# Patient Record
Sex: MALE | Race: WHITE | NOT HISPANIC OR LATINO | Employment: FULL TIME | URBAN - NONMETROPOLITAN AREA
[De-identification: names, ages, dates, MRNs, and addresses within clinical notes are randomized per-mention and may not be internally consistent; named-entity substitution may affect disease eponyms.]

---

## 2017-01-19 ENCOUNTER — OFFICE VISIT (OUTPATIENT)
Dept: NEUROLOGY | Facility: CLINIC | Age: 42
End: 2017-01-19

## 2017-01-19 VITALS
OXYGEN SATURATION: 98 % | HEIGHT: 68 IN | WEIGHT: 267 LBS | HEART RATE: 77 BPM | DIASTOLIC BLOOD PRESSURE: 90 MMHG | SYSTOLIC BLOOD PRESSURE: 150 MMHG | BODY MASS INDEX: 40.47 KG/M2

## 2017-01-19 DIAGNOSIS — R53.83 FATIGUE, UNSPECIFIED TYPE: ICD-10-CM

## 2017-01-19 DIAGNOSIS — G47.19 EXCESSIVE DAYTIME SLEEPINESS: Primary | ICD-10-CM

## 2017-01-19 PROCEDURE — 99214 OFFICE O/P EST MOD 30 MIN: CPT | Performed by: NURSE PRACTITIONER

## 2017-01-19 RX ORDER — CALCIUM CARBONATE 750 MG/1
750 TABLET, CHEWABLE ORAL AS NEEDED
COMMUNITY

## 2017-01-19 RX ORDER — METOPROLOL SUCCINATE 50 MG/1
TABLET, EXTENDED RELEASE ORAL 2 TIMES DAILY
Refills: 2 | COMMUNITY
Start: 2017-01-04 | End: 2017-05-04 | Stop reason: SDUPTHER

## 2017-01-19 RX ORDER — PRAVASTATIN SODIUM 40 MG
TABLET ORAL
COMMUNITY
Start: 2014-09-16

## 2017-01-19 RX ORDER — VERAPAMIL HYDROCHLORIDE 240 MG/1
CAPSULE, EXTENDED RELEASE ORAL DAILY
COMMUNITY
Start: 2014-09-16

## 2017-01-19 RX ORDER — TRAZODONE HYDROCHLORIDE 100 MG/1
TABLET ORAL
COMMUNITY
Start: 2014-09-16

## 2017-01-19 RX ORDER — MONTELUKAST SODIUM 10 MG/1
TABLET ORAL
COMMUNITY

## 2017-02-24 ENCOUNTER — HOSPITAL ENCOUNTER (OUTPATIENT)
Dept: SLEEP MEDICINE | Facility: HOSPITAL | Age: 42
Setting detail: THERAPIES SERIES
End: 2017-02-24

## 2017-02-24 DIAGNOSIS — R53.83 FATIGUE, UNSPECIFIED TYPE: ICD-10-CM

## 2017-02-24 DIAGNOSIS — G47.19 EXCESSIVE DAYTIME SLEEPINESS: ICD-10-CM

## 2017-02-24 PROCEDURE — 95810 POLYSOM 6/> YRS 4/> PARAM: CPT | Performed by: PSYCHIATRY & NEUROLOGY

## 2017-02-24 PROCEDURE — 95810 POLYSOM 6/> YRS 4/> PARAM: CPT

## 2017-03-30 ENCOUNTER — OFFICE VISIT (OUTPATIENT)
Dept: NEUROLOGY | Facility: CLINIC | Age: 42
End: 2017-03-30

## 2017-03-30 VITALS
BODY MASS INDEX: 40.47 KG/M2 | HEIGHT: 68 IN | SYSTOLIC BLOOD PRESSURE: 142 MMHG | DIASTOLIC BLOOD PRESSURE: 72 MMHG | WEIGHT: 267 LBS

## 2017-03-30 DIAGNOSIS — D50.8 OTHER IRON DEFICIENCY ANEMIA: Primary | ICD-10-CM

## 2017-03-30 PROCEDURE — 99214 OFFICE O/P EST MOD 30 MIN: CPT | Performed by: NURSE PRACTITIONER

## 2017-03-30 RX ORDER — ROPINIROLE 0.5 MG/1
TABLET, FILM COATED ORAL
Qty: 30 TABLET | Refills: 5 | Status: SHIPPED | OUTPATIENT
Start: 2017-03-30

## 2017-03-30 RX ORDER — ROPINIROLE 0.5 MG/1
TABLET, FILM COATED ORAL
Qty: 30 TABLET | Refills: 5 | Status: SHIPPED | OUTPATIENT
Start: 2017-03-30 | End: 2017-03-30 | Stop reason: SDUPTHER

## 2017-05-04 RX ORDER — METOPROLOL SUCCINATE 50 MG/1
TABLET, EXTENDED RELEASE ORAL
Qty: 90 TABLET | Refills: 1 | Status: SHIPPED | OUTPATIENT
Start: 2017-05-04

## 2020-07-24 ENCOUNTER — TRANSCRIBE ORDERS (OUTPATIENT)
Dept: ADMINISTRATIVE | Facility: HOSPITAL | Age: 45
End: 2020-07-24

## 2020-07-24 ENCOUNTER — HOSPITAL ENCOUNTER (OUTPATIENT)
Dept: GENERAL RADIOLOGY | Facility: HOSPITAL | Age: 45
Discharge: HOME | End: 2020-07-24
Admitting: FAMILY MEDICINE

## 2020-07-24 DIAGNOSIS — J45.909 ASTHMA, UNSPECIFIED ASTHMA SEVERITY, UNSPECIFIED WHETHER COMPLICATED, UNSPECIFIED WHETHER PERSISTENT: ICD-10-CM

## 2020-07-24 DIAGNOSIS — J45.909 ASTHMA, UNSPECIFIED ASTHMA SEVERITY, UNSPECIFIED WHETHER COMPLICATED, UNSPECIFIED WHETHER PERSISTENT: Primary | ICD-10-CM

## 2020-07-24 PROCEDURE — 71046 X-RAY EXAM CHEST 2 VIEWS: CPT

## 2025-04-16 NOTE — PROGRESS NOTES
"Chief Complaint  Witnessed Apnea, Daytime Sleepiness, and Fatigue    Subjective     History of Present Illness:  Tejas Garcia is a 50 y.o. male with COPD, hypertension, hyperlipidemia, migraines, and obesity.  The patient is referred by ZACH Mandel with primary care.  Patient has had multiple sleep studies.  A home sleep test obtained on 10/6/2023 revealed mild obstructive sleep apnea with an AHI of 6.0/h. He did use pap but had difficulty keeping the mask on.  He is very tired and tosses and turns frequently.  Review of self-reported questionnaire notes that he is \"exhausted always\".  He typically goes to bed at 330-4 AM waking at 11 AM on weekdays and weekends.  Patient estimates an average of 6 hours of fragmented sleep nightly.  He does use tobacco, drinks alcohol.    Further details are as follows:    Whiting Scale is (out of 24): Total score: 4     Estimated average amount of sleep per night: 6 hours  Number of times he wakes up at night: 10+ times  Difficulty falling back asleep: yes  It usually takes 10-15 minutes to go to sleep.  He feels sleepy upon waking up: yes  Rotating or night shift work: yes    Drowsiness/Sleepiness:  He exhibits the following:  excessive daytime sleepiness  excessive daytime fatigue  Sleeps a lot on the weekends    Snoring/Breathing:  He exhibits the following:  quits breathing at night and morning headaches    Head Injury:  He exhibits the following:  No    Reflux:  He describes the following:  Not waking with reflux    Narcolepsy:  He exhibits the following:  none    RLS/PLMs:  He describes the following:  none    Insomnia:  He describes the following:  frequent awakenings  bothered by pain at night   restless sleep    Parasomnia:  He exhibits the following:  none    Weight:       04/21/25  1451   Weight: 99.3 kg (219 lb)      Weight change in the last year:  gain: 0 lbs    The patient's relevant past medical, surgical, family, and social history reviewed and updated in " Epic as appropriate.    Review of Systems   Constitutional: Negative.    HENT: Negative.     Eyes: Negative.    Respiratory: Negative.     Cardiovascular: Negative.    Gastrointestinal: Negative.    Endocrine: Negative.    Genitourinary: Negative.    Musculoskeletal: Negative.    Skin: Negative.    Allergic/Immunologic: Negative.    Neurological: Negative.    Hematological: Negative.    Psychiatric/Behavioral: Negative.     All other systems reviewed and are negative.      PMH:    Past Medical History:   Diagnosis Date    Benign hypertension     COPD (chronic obstructive pulmonary disease)     Dyslipidemia     Hyperlipidemia     Migraines     Mild obesity     RYAN (obstructive sleep apnea)     PVC (premature ventricular contraction)     Holter monitor revealing a normal sinus rhythm rare PVC, unifocal in nature.     Seasonal allergies     Tachycardia      Past Surgical History:   Procedure Laterality Date    CARPAL TUNNEL RELEASE      CUBITAL TUNNEL RELEASE      SHOULDER SURGERY         No Known Allergies    MEDS:  Prior to Admission medications    Medication Sig Start Date End Date Taking? Authorizing Provider   amoxicillin-clavulanate (AUGMENTIN) 875-125 MG per tablet 1 po bid 2/27/25   Maria Alejandra Higuera MD   aspirin 81 MG tablet Take  by mouth Daily. 9/16/14   Nella Canas MD   calcium carbonate EX (TUMS EX) 750 MG chewable tablet Chew 1 tablet As Needed for Indigestion or Heartburn.    Nella Canas MD   Cholecalciferol 25 MCG (1000 UT) tablet Take 5 tablets by mouth Daily.    Nella Canas MD   Fluticasone Furoate-Vilanterol (Breo Ellipta) 100-25 MCG/ACT aerosol powder  Inhale 1 puff Daily. 2/15/22   Nella Canas MD   hydroCHLOROthiazide (MICROZIDE) 12.5 MG capsule Take 1 capsule by mouth Daily.    Emergency, Nurse Epic, RN   ipratropium-albuterol (DUO-NEB) 0.5-2.5 mg/3 ml nebulizer Nebulize q 4 h prn wheezing / shortness of breath 5/20/24   Maria Alejandra Higuera MD Lasmiditan  "Succinate 100 MG tablet Take 100 mg by mouth. 11/6/24   Nella Canas MD   methylPREDNISolone (MEDROL) 4 MG dose pack Take as directed on package instructions. 2/27/25   Maria Alejandra Higuera MD   metoprolol succinate XL (TOPROL-XL) 50 MG 24 hr tablet TAKE 1 TABLET DAILY. 5/4/17   Case, RAGHU Bellamy   montelukast (SINGULAIR) 10 MG tablet Montelukast Sodium 10 MG Oral Tablet; Patient Sig: Montelukast Sodium 10 MG Oral Tablet TAKE 1 TABLET BY MOUTH DAILY; 0; 16-Sep-2014; Active    Provider, MD Nella   pravastatin (PRAVACHOL) 40 MG tablet Take  by mouth. 9/16/14   Nella Canas MD   tadalafil (CIALIS) 5 MG tablet Take  by mouth. 4/19/24   Nella Canas MD   topiramate (TOPAMAX) 50 MG tablet Take 3 tablets by mouth Daily. 11/6/24   ProviderNella MD       FH:  History reviewed. No pertinent family history.    Objective   Vital Signs:  /86   Pulse 81   Temp 98.5 °F (36.9 °C) (Temporal)   Ht 175.3 cm (69\")   Wt 99.3 kg (219 lb)   SpO2 96%   BMI 32.34 kg/m²     Patient's (Body mass index is 32.34 kg/m².) indicates that they are obese (BMI >30) with health related conditions that include obstructive sleep apnea, hypertension, coronary heart disease, and diabetes mellitus . Weight is unchanged. BMI is is above average; BMI management plan is completed. We discussed portion control and increasing exercise.         Physical Exam  Vitals reviewed.   Constitutional:       Appearance: Normal appearance.   HENT:      Head: Normocephalic and atraumatic.      Nose: Nose normal.      Mouth/Throat:      Mouth: Mucous membranes are moist.   Cardiovascular:      Rate and Rhythm: Normal rate and regular rhythm.      Heart sounds: No murmur heard.     No friction rub. No gallop.   Pulmonary:      Effort: Pulmonary effort is normal. No respiratory distress.      Breath sounds: Normal breath sounds. No wheezing or rhonchi.   Neurological:      Mental Status: He is alert and oriented to person, " place, and time.   Psychiatric:         Behavior: Behavior normal.       Mallampati Score: IV (only hard palate visible)    Result Review :              Assessment and Plan  Tejas Garcia is a 50 y.o. male with COPD, hypertension, hyperlipidemia, migraines, and obesity who presents for further evaluation of RYAN previously on PAP therapy.  Most recent sleep study showed mild sleep apnea with an AHI of 6/h.  Patient has tried PAP therapy in the past but frequently wakes with the mask off.  He has been intolerant to the mask and device.  He does retain his device and I have asked him to restart therapy.  I will also obtain an overnight oximetry study for further evaluation of nocturnal hypoxemia.  Patient has a history of COPD.  If overnight oximetry study is abnormal then recommendation would be for in-lab titration study plus or minus oxygen.  I suspect some of his symptoms including frequent and ongoing daytime sleepiness and fatigue, nonrestorative sleep, and migraines are related to uncontrolled sleep apnea.  Patient will follow-up in 2 to 3 months with download.  I have discussed alternatives to PAP therapy including MAD, and surgical consult.  Patient's sleep apnea is mild such that he does not qualify for inspire consult.    Diagnoses and all orders for this visit:    1. Obstructive sleep apnea, adult (Primary)  -     PAP Therapy    2. Snoring    3. Hypersomnia with sleep apnea    4. Nocturnal hypoxemia  -     Overnight Sleep Oximetry Study; Future    5. RYAN (obstructive sleep apnea)  -     Overnight Sleep Oximetry Study; Future                 I discussed the consequences of uncontrolled sleep apnea including hypertension, heart disease, diabetes, stroke, and dementia. I further discussed sleep apnea therapeutic options including CPAP, Weight loss, Oral dental appliance, and surgery.         Follow Up  Return in about 5 weeks (around 5/26/2025) for Recheck.  Patient was given instructions and counseling  regarding his condition or for health maintenance advice. Please see specific information pulled into the AVS if appropriate.     ZACH Alegria, ACNP-BC  Pulmonology, Critical Care, and Sleep Medicine

## 2025-04-21 ENCOUNTER — OFFICE VISIT (OUTPATIENT)
Dept: SLEEP MEDICINE | Age: 50
End: 2025-04-21
Payer: COMMERCIAL

## 2025-04-21 VITALS
HEIGHT: 69 IN | DIASTOLIC BLOOD PRESSURE: 86 MMHG | BODY MASS INDEX: 32.44 KG/M2 | HEART RATE: 81 BPM | OXYGEN SATURATION: 96 % | WEIGHT: 219 LBS | SYSTOLIC BLOOD PRESSURE: 140 MMHG | TEMPERATURE: 98.5 F

## 2025-04-21 DIAGNOSIS — G47.34 NOCTURNAL HYPOXEMIA: ICD-10-CM

## 2025-04-21 DIAGNOSIS — G47.33 OBSTRUCTIVE SLEEP APNEA, ADULT: Primary | ICD-10-CM

## 2025-04-21 DIAGNOSIS — R06.83 SNORING: ICD-10-CM

## 2025-04-21 DIAGNOSIS — G47.33 OSA (OBSTRUCTIVE SLEEP APNEA): ICD-10-CM

## 2025-04-21 DIAGNOSIS — G47.30 HYPERSOMNIA WITH SLEEP APNEA: ICD-10-CM

## 2025-04-21 DIAGNOSIS — G47.10 HYPERSOMNIA WITH SLEEP APNEA: ICD-10-CM

## 2025-04-21 PROCEDURE — 99203 OFFICE O/P NEW LOW 30 MIN: CPT | Performed by: NURSE PRACTITIONER

## 2025-04-21 RX ORDER — TRAZODONE HYDROCHLORIDE 100 MG/1
TABLET ORAL
COMMUNITY
Start: 2025-04-18

## 2025-05-16 ENCOUNTER — OFFICE VISIT (OUTPATIENT)
Dept: CARDIOLOGY | Facility: CLINIC | Age: 50
End: 2025-05-16
Payer: COMMERCIAL

## 2025-05-16 VITALS
HEART RATE: 78 BPM | DIASTOLIC BLOOD PRESSURE: 76 MMHG | WEIGHT: 210.1 LBS | SYSTOLIC BLOOD PRESSURE: 130 MMHG | OXYGEN SATURATION: 96 % | HEIGHT: 69 IN | BODY MASS INDEX: 31.12 KG/M2

## 2025-05-16 DIAGNOSIS — R07.2 PRECORDIAL PAIN: Primary | ICD-10-CM

## 2025-05-16 DIAGNOSIS — R00.2 PALPITATIONS: ICD-10-CM

## 2025-05-16 PROCEDURE — 93000 ELECTROCARDIOGRAM COMPLETE: CPT | Performed by: INTERNAL MEDICINE

## 2025-05-16 PROCEDURE — 99204 OFFICE O/P NEW MOD 45 MIN: CPT | Performed by: INTERNAL MEDICINE

## 2025-05-16 RX ORDER — METOPROLOL TARTRATE 25 MG/1
100 TABLET, FILM COATED ORAL ONCE
OUTPATIENT
Start: 2025-05-16

## 2025-05-16 RX ORDER — METOPROLOL TARTRATE 25 MG/1
200 TABLET, FILM COATED ORAL ONCE
OUTPATIENT
Start: 2025-05-16 | End: 2025-05-16

## 2025-05-16 RX ORDER — METOPROLOL TARTRATE 25 MG/1
50 TABLET, FILM COATED ORAL ONCE
OUTPATIENT
Start: 2025-05-16

## 2025-05-16 RX ORDER — IVABRADINE 5 MG/1
15 TABLET, FILM COATED ORAL ONCE
OUTPATIENT
Start: 2025-05-16 | End: 2025-05-16

## 2025-05-16 RX ORDER — SODIUM CHLORIDE 9 MG/ML
40 INJECTION, SOLUTION INTRAVENOUS AS NEEDED
OUTPATIENT
Start: 2025-05-16

## 2025-05-16 RX ORDER — VERAPAMIL HYDROCHLORIDE 240 MG/1
1 CAPSULE, DELAYED RELEASE ORAL DAILY
COMMUNITY
Start: 2025-04-18

## 2025-05-16 RX ORDER — SODIUM CHLORIDE 0.9 % (FLUSH) 0.9 %
10 SYRINGE (ML) INJECTION AS NEEDED
OUTPATIENT
Start: 2025-05-16

## 2025-05-16 RX ORDER — METOPROLOL TARTRATE 1 MG/ML
5 INJECTION, SOLUTION INTRAVENOUS
OUTPATIENT
Start: 2025-05-16

## 2025-05-16 RX ORDER — NITROGLYCERIN 0.4 MG/1
0.4 TABLET SUBLINGUAL
OUTPATIENT
Start: 2025-05-16 | End: 2025-05-16

## 2025-05-16 RX ORDER — NITROGLYCERIN 0.4 MG/1
0.8 TABLET SUBLINGUAL
OUTPATIENT
Start: 2025-05-16

## 2025-05-16 RX ORDER — SODIUM CHLORIDE 0.9 % (FLUSH) 0.9 %
10 SYRINGE (ML) INJECTION EVERY 12 HOURS SCHEDULED
OUTPATIENT
Start: 2025-05-16

## 2025-05-16 RX ORDER — METOPROLOL TARTRATE 50 MG
50 TABLET ORAL
OUTPATIENT
Start: 2025-05-16

## 2025-05-16 RX ORDER — METOPROLOL TARTRATE 25 MG/1
150 TABLET, FILM COATED ORAL ONCE
OUTPATIENT
Start: 2025-05-16

## 2025-05-16 RX ORDER — METOPROLOL TARTRATE 50 MG
TABLET ORAL
Qty: 2 TABLET | Refills: 0 | Status: SHIPPED | OUTPATIENT
Start: 2025-05-16

## 2025-05-16 NOTE — PROGRESS NOTES
"Chief Complaint  Essential HTN and Establish Care (New Patient)      Subjective   History of Present Illness    Problem List  -Brother  of MI  -Atypical chest pain  -HTN  -HLD  -Smoker  -Presyncope  -Ethanol abuse  -EKG normal    Mr. Garcia is a 50 year old who is being seen for the above.  Stood out of his car fairly quickly and had to go to ground.  Unclear if lost consciousness.  Gets occasional shooting chest pain.  Brother just  of MI.  Ros negative except for the above.         Objective   Vital Signs:  Vitals:    25 1508   BP: 130/76   Pulse: 78   SpO2: 96%     Estimated body mass index is 31.01 kg/m² as calculated from the following:    Height as of this encounter: 175.3 cm (69.02\").    Weight as of this encounter: 95.3 kg (210 lb 1.6 oz).       Physical Exam  HENT:      Head: Normocephalic.   Eyes:      Extraocular Movements: Extraocular movements intact.   Cardiovascular:      Rate and Rhythm: Normal rate and regular rhythm.      Heart sounds: No murmur heard.     No gallop.   Pulmonary:      Breath sounds: Normal breath sounds.   Abdominal:      Palpations: Abdomen is soft.   Musculoskeletal:      Right lower leg: No edema.      Left lower leg: No edema.   Skin:     General: Skin is warm and dry.   Neurological:      General: No focal deficit present.      Mental Status: He is alert.   Psychiatric:         Mood and Affect: Mood normal.           ECG 12 Lead    Date/Time: 2025 3:48 PM  Performed by: Jose Montoya MD    Authorized by: Jose Montoya MD  Rhythm: sinus rhythm    Clinical impression: normal ECG           Assessment   -Brother  of MI  -Atypical chest pain  -HTN  -HLD  -Smoker  -Presyncope  -Ethanol abuse  -EKG normal    Plan   -coronary CT angiogram.      Return in about 6 weeks (around 2025).  Jose Montoya MD  2025 15:48 EDT     "

## 2025-05-25 ENCOUNTER — RESULTS FOLLOW-UP (OUTPATIENT)
Dept: CARDIOLOGY | Facility: CLINIC | Age: 50
End: 2025-05-25
Payer: COMMERCIAL

## 2025-06-13 ENCOUNTER — OFFICE VISIT (OUTPATIENT)
Dept: CARDIOLOGY | Facility: CLINIC | Age: 50
End: 2025-06-13
Payer: COMMERCIAL

## 2025-06-13 VITALS
WEIGHT: 208.6 LBS | OXYGEN SATURATION: 97 % | HEART RATE: 82 BPM | DIASTOLIC BLOOD PRESSURE: 72 MMHG | HEIGHT: 69 IN | BODY MASS INDEX: 30.9 KG/M2 | SYSTOLIC BLOOD PRESSURE: 124 MMHG

## 2025-06-13 DIAGNOSIS — R00.2 PALPITATIONS: Primary | ICD-10-CM

## 2025-06-13 PROCEDURE — 99214 OFFICE O/P EST MOD 30 MIN: CPT | Performed by: INTERNAL MEDICINE

## 2025-06-13 NOTE — PROGRESS NOTES
"Chief Complaint  Follow-up (6 Week follow up)      Subjective   History of Present Illness    Problem List  -Brother  of MI  -Atypical chest pain  -HTN  -HLD  -Smoker  -Presyncope  -Ethanol abuse  -EKG normal  -Holter showed 27 beat of NSVT or SVT with BBB, asymptomatic    Mr. Garcia is a 50 year old who is being seen for the above.  Stood out of his car fairly quickly and had to go to ground.  Unclear if lost consciousness.  Gets occasional shooting chest pain.  Brother just  of MI.  Ros negative except for the above.    Update 25  No complaints.  Arrhythmia was asymptomatic.          Objective   Vital Signs:  Vitals:    25 1345   BP: 124/72   Pulse: 82   SpO2: 97%     Estimated body mass index is 30.79 kg/m² as calculated from the following:    Height as of this encounter: 175.3 cm (69.02\").    Weight as of this encounter: 94.6 kg (208 lb 9.6 oz).       Physical Exam  HENT:      Head: Normocephalic.   Eyes:      Extraocular Movements: Extraocular movements intact.   Cardiovascular:      Rate and Rhythm: Normal rate and regular rhythm.      Heart sounds: No murmur heard.     No gallop.   Pulmonary:      Breath sounds: Normal breath sounds.   Abdominal:      Palpations: Abdomen is soft.   Musculoskeletal:      Right lower leg: No edema.      Left lower leg: No edema.   Skin:     General: Skin is warm and dry.   Neurological:      General: No focal deficit present.      Mental Status: He is alert.   Psychiatric:         Mood and Affect: Mood normal.               Assessment   -Brother  of MI  -Atypical chest pain  -HTN  -HLD  -Smoker  -Presyncope  -Ethanol abuse  -EKG normal  -Holter showed 27 beat of NSVT or SVT with BBB, asymptomatic    Plan   -coronary CT angiogram.  If relatively normal will likely repeat monitor.      Return in about 4 weeks (around 2025).  Jose Montoya MD      "

## 2025-06-19 ENCOUNTER — TELEPHONE (OUTPATIENT)
Facility: HOSPITAL | Age: 50
End: 2025-06-19
Payer: COMMERCIAL

## 2025-06-19 NOTE — TELEPHONE ENCOUNTER
Attempted to contact patient as pre-procedure phone call prior to planned CT angiogram coronary planned for 6/20/25. Left voicemail message reminder with arrival time of 1545 to main registration, no caffeine after midnight, okay to take medications as per normal routine unless taking a stimulant,  is recommended, and if have any questions may contact outpatient prep and recovery at [phone].

## 2025-06-20 ENCOUNTER — HOSPITAL ENCOUNTER (OUTPATIENT)
Facility: HOSPITAL | Age: 50
Discharge: HOME | End: 2025-06-20
Payer: COMMERCIAL

## 2025-06-20 VITALS
SYSTOLIC BLOOD PRESSURE: 126 MMHG | OXYGEN SATURATION: 97 % | TEMPERATURE: 97.9 F | HEIGHT: 69 IN | BODY MASS INDEX: 30.82 KG/M2 | WEIGHT: 208.11 LBS | RESPIRATION RATE: 15 BRPM | HEART RATE: 68 BPM | DIASTOLIC BLOOD PRESSURE: 81 MMHG

## 2025-06-20 DIAGNOSIS — R07.2 PRECORDIAL PAIN: ICD-10-CM

## 2025-06-20 PROCEDURE — 25510000001 IOPAMIDOL PER 1 ML: Performed by: INTERNAL MEDICINE

## 2025-06-20 PROCEDURE — 75574 CT ANGIO HRT W/3D IMAGE: CPT

## 2025-06-20 RX ORDER — METOPROLOL TARTRATE 100 MG/1
100 TABLET ORAL ONCE
Status: COMPLETED | OUTPATIENT
Start: 2025-06-20 | End: 2025-06-20

## 2025-06-20 RX ORDER — METOPROLOL TARTRATE 25 MG/1
50 TABLET, FILM COATED ORAL
Status: DISCONTINUED | OUTPATIENT
Start: 2025-06-20 | End: 2025-06-21 | Stop reason: HOSPADM

## 2025-06-20 RX ORDER — NITROGLYCERIN 0.4 MG/1
0.8 TABLET SUBLINGUAL
Status: COMPLETED | OUTPATIENT
Start: 2025-06-20 | End: 2025-06-20

## 2025-06-20 RX ORDER — IVABRADINE 5 MG/1
15 TABLET, FILM COATED ORAL ONCE
Status: DISCONTINUED | OUTPATIENT
Start: 2025-06-20 | End: 2025-06-21 | Stop reason: HOSPADM

## 2025-06-20 RX ORDER — NITROGLYCERIN 0.4 MG/1
0.4 TABLET SUBLINGUAL
Status: COMPLETED | OUTPATIENT
Start: 2025-06-20 | End: 2025-06-20

## 2025-06-20 RX ORDER — METOPROLOL TARTRATE 1 MG/ML
5 INJECTION, SOLUTION INTRAVENOUS
Status: DISCONTINUED | OUTPATIENT
Start: 2025-06-20 | End: 2025-06-21 | Stop reason: HOSPADM

## 2025-06-20 RX ORDER — SODIUM CHLORIDE 9 MG/ML
40 INJECTION, SOLUTION INTRAVENOUS AS NEEDED
Status: DISCONTINUED | OUTPATIENT
Start: 2025-06-20 | End: 2025-06-21 | Stop reason: HOSPADM

## 2025-06-20 RX ORDER — METOPROLOL TARTRATE 25 MG/1
50 TABLET, FILM COATED ORAL ONCE
Status: COMPLETED | OUTPATIENT
Start: 2025-06-20 | End: 2025-06-20

## 2025-06-20 RX ORDER — SODIUM CHLORIDE 0.9 % (FLUSH) 0.9 %
10 SYRINGE (ML) INJECTION EVERY 12 HOURS SCHEDULED
Status: DISCONTINUED | OUTPATIENT
Start: 2025-06-20 | End: 2025-06-21 | Stop reason: HOSPADM

## 2025-06-20 RX ORDER — METOPROLOL TARTRATE 100 MG/1
200 TABLET ORAL ONCE
Status: COMPLETED | OUTPATIENT
Start: 2025-06-20 | End: 2025-06-20

## 2025-06-20 RX ORDER — SODIUM CHLORIDE 0.9 % (FLUSH) 0.9 %
10 SYRINGE (ML) INJECTION AS NEEDED
Status: DISCONTINUED | OUTPATIENT
Start: 2025-06-20 | End: 2025-06-21 | Stop reason: HOSPADM

## 2025-06-20 RX ORDER — IOPAMIDOL 755 MG/ML
100 INJECTION, SOLUTION INTRAVASCULAR
Status: COMPLETED | OUTPATIENT
Start: 2025-06-20 | End: 2025-06-20

## 2025-06-20 RX ADMIN — IOPAMIDOL 65 ML: 755 INJECTION, SOLUTION INTRAVENOUS at 17:45

## 2025-06-20 RX ADMIN — METOPROLOL TARTRATE 5 MG: 5 INJECTION INTRAVENOUS at 17:04

## 2025-06-20 RX ADMIN — METOPROLOL TARTRATE 5 MG: 5 INJECTION INTRAVENOUS at 17:26

## 2025-06-20 RX ADMIN — NITROGLYCERIN 0.8 MG: 0.4 TABLET SUBLINGUAL at 17:29

## 2025-06-20 RX ADMIN — METOPROLOL TARTRATE 150 MG: 100 TABLET, FILM COATED ORAL at 16:23

## 2025-06-25 ENCOUNTER — HOSPITAL ENCOUNTER (OUTPATIENT)
Dept: CT IMAGING | Facility: HOSPITAL | Age: 50
Discharge: HOME | End: 2025-06-25
Admitting: INTERNAL MEDICINE
Payer: COMMERCIAL

## 2025-06-25 DIAGNOSIS — R93.1 ABNORMAL FINDINGS DIAGNOSTIC IMAGING OF HEART AND CORONARY CIRCULATION: Primary | ICD-10-CM

## 2025-06-25 DIAGNOSIS — R93.1 ABNORMAL FINDINGS DIAGNOSTIC IMAGING OF HEART AND CORONARY CIRCULATION: ICD-10-CM

## 2025-06-25 PROCEDURE — 75580 N-INVAS EST C FFR SW ALY CTA: CPT

## 2025-07-18 ENCOUNTER — OFFICE VISIT (OUTPATIENT)
Dept: CARDIOLOGY | Facility: CLINIC | Age: 50
End: 2025-07-18
Payer: COMMERCIAL

## 2025-07-18 ENCOUNTER — LAB (OUTPATIENT)
Facility: HOSPITAL | Age: 50
End: 2025-07-18
Payer: COMMERCIAL

## 2025-07-18 VITALS
SYSTOLIC BLOOD PRESSURE: 124 MMHG | HEIGHT: 69 IN | DIASTOLIC BLOOD PRESSURE: 78 MMHG | WEIGHT: 210.8 LBS | HEART RATE: 81 BPM | BODY MASS INDEX: 31.22 KG/M2 | OXYGEN SATURATION: 95 %

## 2025-07-18 DIAGNOSIS — E78.5 DYSLIPIDEMIA: ICD-10-CM

## 2025-07-18 DIAGNOSIS — R93.1 ABNORMAL FINDINGS DIAGNOSTIC IMAGING OF HEART AND CORONARY CIRCULATION: Primary | ICD-10-CM

## 2025-07-18 PROCEDURE — 83695 ASSAY OF LIPOPROTEIN(A): CPT

## 2025-07-18 PROCEDURE — 86141 C-REACTIVE PROTEIN HS: CPT

## 2025-07-18 PROCEDURE — 36415 COLL VENOUS BLD VENIPUNCTURE: CPT

## 2025-07-18 PROCEDURE — 80053 COMPREHEN METABOLIC PANEL: CPT

## 2025-07-18 PROCEDURE — 99214 OFFICE O/P EST MOD 30 MIN: CPT | Performed by: INTERNAL MEDICINE

## 2025-07-18 PROCEDURE — 80061 LIPID PANEL: CPT

## 2025-07-18 NOTE — PROGRESS NOTES
"Chief Complaint  Follow-up (4 week)      Subjective   History of Present Illness    Problem List  -Brother  of MI  -Extensive non obstructive coronary artery disease.  , 579 mm3 of plaque volume, 28% calcified  -Atypical chest pain  -HTN  -HLD  -Smoker  -Presyncope  -Ethanol abuse  -EKG normal  -Holter showed 27 beat of NSVT or SVT with BBB, asymptomatic    Mr. Garcia is a 50 year old who is being seen for the above.  Stood out of his car fairly quickly and had to go to ground.  Unclear if lost consciousness.  Gets occasional shooting chest pain.  Brother just  of MI.  Ros negative except for the above.    Update 25  No complaints.  Arrhythmia was asymptomatic.          Objective   Vital Signs:  Vitals:    25 1312   BP: 124/78   Pulse: 81   SpO2: 95%     Estimated body mass index is 31.12 kg/m² as calculated from the following:    Height as of this encounter: 175.3 cm (69.02\").    Weight as of this encounter: 95.6 kg (210 lb 12.8 oz).       Physical Exam  HENT:      Head: Normocephalic.   Eyes:      Extraocular Movements: Extraocular movements intact.   Cardiovascular:      Rate and Rhythm: Normal rate and regular rhythm.      Heart sounds: No murmur heard.     No gallop.   Pulmonary:      Breath sounds: Normal breath sounds.   Abdominal:      Palpations: Abdomen is soft.   Musculoskeletal:      Right lower leg: No edema.      Left lower leg: No edema.   Skin:     General: Skin is warm and dry.   Neurological:      General: No focal deficit present.      Mental Status: He is alert.   Psychiatric:         Mood and Affect: Mood normal.               Assessment   -Brother  of MI  -Extensive non obstructive coronary artery disease.  , 579 mm3 of plaque volume, 28% calcified  -Atypical chest pain  -HTN  -HLD  -Smoker  -Presyncope  -Ethanol abuse  -EKG normal  -Holter showed 27 beat of NSVT or SVT with BBB, asymptomatic    Plan   -aspirin 81 mg  -pravastatin  -blood work  -quitting " smoking  -considering GLP-1 agonist    Few month follow up  Jose Montoya MD

## 2025-07-19 LAB
ALBUMIN SERPL-MCNC: 4 G/DL (ref 3.5–5.2)
ALBUMIN/GLOB SERPL: 1.1 G/DL
ALP SERPL-CCNC: 84 U/L (ref 39–117)
ALT SERPL W P-5'-P-CCNC: 31 U/L (ref 1–41)
ANION GAP SERPL CALCULATED.3IONS-SCNC: 12 MMOL/L (ref 5–15)
AST SERPL-CCNC: 51 U/L (ref 1–40)
BILIRUB SERPL-MCNC: 0.6 MG/DL (ref 0–1.2)
BUN SERPL-MCNC: 9 MG/DL (ref 6–20)
BUN/CREAT SERPL: 9.2 (ref 7–25)
CALCIUM SPEC-SCNC: 9.3 MG/DL (ref 8.6–10.5)
CHLORIDE SERPL-SCNC: 98 MMOL/L (ref 98–107)
CHOLEST SERPL-MCNC: 127 MG/DL (ref 0–200)
CO2 SERPL-SCNC: 26 MMOL/L (ref 22–29)
CREAT SERPL-MCNC: 0.98 MG/DL (ref 0.76–1.27)
CRP SERPL-MCNC: 0.08 MG/DL (ref 0.01–0.5)
EGFRCR SERPLBLD CKD-EPI 2021: 94.5 ML/MIN/1.73
GLOBULIN UR ELPH-MCNC: 3.5 GM/DL
GLUCOSE SERPL-MCNC: 80 MG/DL (ref 65–99)
HDLC SERPL-MCNC: 33 MG/DL (ref 40–60)
LDLC SERPL CALC-MCNC: 41 MG/DL (ref 0–100)
LDLC/HDLC SERPL: 0.7 {RATIO}
POTASSIUM SERPL-SCNC: 4.6 MMOL/L (ref 3.5–5.2)
PROT SERPL-MCNC: 7.5 G/DL (ref 6–8.5)
SODIUM SERPL-SCNC: 136 MMOL/L (ref 136–145)
TRIGL SERPL-MCNC: 354 MG/DL (ref 0–150)
VLDLC SERPL-MCNC: 53 MG/DL (ref 5–40)

## 2025-07-21 ENCOUNTER — RESULTS FOLLOW-UP (OUTPATIENT)
Dept: CARDIOLOGY | Facility: CLINIC | Age: 50
End: 2025-07-21
Payer: COMMERCIAL

## 2025-07-21 LAB — LPA SERPL-SCNC: <8.4 NMOL/L

## 2025-07-21 NOTE — PROGRESS NOTES
Triglycerides uncontrolled.  Would suggest vascepa 2 grams twice daily.  Cmp, lipid panel, hscrp in 3 months.

## 2025-07-31 NOTE — TELEPHONE ENCOUNTER
Called pt with results and recommendations per Dr Montoya    Triglycerides uncontrolled. Would suggest vascepa 2 grams twice daily. Cmp, lipid panel, hscrp in 3 months.     No answer, lvm x2    Will await call back